# Patient Record
Sex: MALE | Race: WHITE | Employment: FULL TIME | URBAN - METROPOLITAN AREA
[De-identification: names, ages, dates, MRNs, and addresses within clinical notes are randomized per-mention and may not be internally consistent; named-entity substitution may affect disease eponyms.]

---

## 2018-01-24 ENCOUNTER — HOSPITAL ENCOUNTER (OUTPATIENT)
Dept: RADIOLOGY | Facility: REHABILITATION | Age: 33
End: 2018-01-24
Attending: PAIN MEDICINE
Payer: COMMERCIAL

## 2018-01-24 ENCOUNTER — HOSPITAL ENCOUNTER (OUTPATIENT)
Dept: PAIN MANAGEMENT | Facility: REHABILITATION | Age: 33
End: 2018-01-24
Attending: PAIN MEDICINE
Payer: COMMERCIAL

## 2018-01-24 VITALS
HEART RATE: 72 BPM | HEIGHT: 71 IN | TEMPERATURE: 98 F | BODY MASS INDEX: 24.88 KG/M2 | RESPIRATION RATE: 16 BRPM | SYSTOLIC BLOOD PRESSURE: 140 MMHG | OXYGEN SATURATION: 96 % | DIASTOLIC BLOOD PRESSURE: 77 MMHG | WEIGHT: 177.69 LBS

## 2018-01-24 PROCEDURE — 700102 HCHG RX REV CODE 250 W/ 637 OVERRIDE(OP)

## 2018-01-24 PROCEDURE — 77003 FLUOROGUIDE FOR SPINE INJECT: CPT

## 2018-01-24 PROCEDURE — A9270 NON-COVERED ITEM OR SERVICE: HCPCS

## 2018-01-24 PROCEDURE — 700117 HCHG RX CONTRAST REV CODE 255

## 2018-01-24 PROCEDURE — 64510 N BLOCK STELLATE GANGLION: CPT

## 2018-01-24 PROCEDURE — 700111 HCHG RX REV CODE 636 W/ 250 OVERRIDE (IP)

## 2018-01-24 RX ORDER — MIDAZOLAM HYDROCHLORIDE 1 MG/ML
INJECTION INTRAMUSCULAR; INTRAVENOUS
Status: COMPLETED
Start: 2018-01-24 | End: 2018-01-24

## 2018-01-24 RX ORDER — DEXAMETHASONE SODIUM PHOSPHATE 10 MG/ML
INJECTION, SOLUTION INTRAMUSCULAR; INTRAVENOUS
Status: COMPLETED
Start: 2018-01-24 | End: 2018-01-24

## 2018-01-24 RX ORDER — BUPIVACAINE HYDROCHLORIDE 2.5 MG/ML
INJECTION, SOLUTION EPIDURAL; INFILTRATION; INTRACAUDAL
Status: COMPLETED
Start: 2018-01-24 | End: 2018-01-24

## 2018-01-24 RX ORDER — CLONIDINE HYDROCHLORIDE 0.1 MG/1
TABLET ORAL
Status: COMPLETED
Start: 2018-01-24 | End: 2018-01-24

## 2018-01-24 RX ORDER — LIDOCAINE HYDROCHLORIDE 10 MG/ML
INJECTION, SOLUTION EPIDURAL; INFILTRATION; INTRACAUDAL; PERINEURAL
Status: COMPLETED
Start: 2018-01-24 | End: 2018-01-24

## 2018-01-24 RX ADMIN — MIDAZOLAM HYDROCHLORIDE 2 MG: 1 INJECTION, SOLUTION INTRAMUSCULAR; INTRAVENOUS at 09:53

## 2018-01-24 RX ADMIN — DEXAMETHASONE SODIUM PHOSPHATE 10 MG: 10 INJECTION, SOLUTION INTRAMUSCULAR; INTRAVENOUS at 09:54

## 2018-01-24 RX ADMIN — LIDOCAINE HYDROCHLORIDE 8 ML: 10 INJECTION, SOLUTION EPIDURAL; INFILTRATION; INTRACAUDAL; PERINEURAL at 09:52

## 2018-01-24 RX ADMIN — CLONIDINE HYDROCHLORIDE 0.1 MG: 0.1 TABLET ORAL at 09:54

## 2018-01-24 RX ADMIN — IOHEXOL 1 ML: 240 INJECTION, SOLUTION INTRATHECAL; INTRAVASCULAR; INTRAVENOUS; ORAL at 09:53

## 2018-01-24 RX ADMIN — BUPIVACAINE HYDROCHLORIDE 2 ML: 2.5 INJECTION, SOLUTION EPIDURAL; INFILTRATION; INTRACAUDAL; PERINEURAL at 09:54

## 2018-01-24 ASSESSMENT — PAIN SCALES - GENERAL
PAINLEVEL_OUTOF10: 0
PAINLEVEL_OUTOF10: 2
PAINLEVEL_OUTOF10: 0
PAINLEVEL_OUTOF10: 0

## 2018-01-24 NOTE — OP REPORT
STELLATE GANGLION BLOCK    ATTENDING: Corby Lara MD    ASSISTANT: None    Site: [facility]    PREOPERATIVE DIAGNOSIS: Complex regional pain syndrome of the left upper extremity.    POSTOPERATIVE DIAGNOSIS: Complex regional pain syndrome of the left upper extremity.    PROCEDURE PERFORMED:   1. Stellate ganglion block on the left side.  2. Fluoroscopy for precise needle placement.    ANESTHESIA: Local infiltration with 1% lidocaine and conscious sedation   Anesthesia time: 15 minutes    MONITORS: Automatic blood pressure cuff and pulse oximetry.    INDICATIONS:     MEDICATIONS: See patient's chart  (Please note that the patient's anticoagulant and/or aspirin were held appropriately)    ALLERGIES: See patient's chart    REVIEW OF SYSTEMS: Negative for fever, chills, chest pain, SOB, bleeding abnormalities, nausea, vomiting, diarrhea, worsening edema, or new rashes.    FOCUSED PHYSICAL EXAMINATION: The patient is awake, alert and oriented, and is in no acute distress. Vital signs are stable. The patient is afebrile. The rest of the PE is essentially unchanged from the patient's recent visit to our office.     We explained the procedure to the patient including the risks, benefits and alternatives to the procedure. The patient verbalized understanding and was willing to proceed.    PROCEDURE IN DETAIL: An informed consent was obtained. A peripheral IV line was placed. The patient was taken to the procedure room and was positively identified by the staff and the attending physician. The patient was positioned supine on the procedure bed. Vital signs were monitored as above and remained stable throughout procedure. The skin in the neck was prepped and draped in the standard sterile fashion. A surgical pause (time-out) was performed and was agreed upon by the members of the team. A fluoroscopic view of the cervical spine was obtained, and the C6 vertebra was identified. The C-arm was rotated obliquely toward the side  of injection until the neural foramina of C6 was in plain view. The skin over the base of the C6 uncinate process on the left side was anesthetized. A 22-gauge 3.5-inch needle was advanced onto base of the uncinate process of C6 on the right side. Full contact with the bone was established. Radiopaque contrast was injected in real-time fluoroscopy. There was no intravascular or intrathecal uptake of the dye. After that, a test dose of 1.5 ml of 1% lidocaine was injected. The patient denied having any signs of intravascular absorption of the local anesthetic. That was followed by injecting 4 mL of 0.25% bupivacaine mixed with 10 mg dexamethasone and 100 mcg clonidine. The injection was done in incremental manner. The needle was withdrawn.    The patient was observed in the recovery room. The patient developed Pete's sign on the side of the injection consistent with a successful stellate ganglion block.     COMPLICATIONS: None.    PAIN SCORE BEFORE THE PROCEDURE:      PAIN SCORE AFTER THE PROCEDURE:       DISPOSITION:  1. Discharge to home when the discharge criteria are met.  2. Follow up in pain clinic in 2-4 weeks.  3. (The patient will resume her medication).  4. (The patient will monitor their blood glucose over the next week as instructed and was advised to contact us if the reading is greater than 250mg/dL or if they develop any signs or symptoms of hyperglycemia.)

## 2018-01-24 NOTE — PROGRESS NOTES
Hand off report received from L.Gentiluomo CRUMP.Pt position supine by CST, RN and Xray tech.  Arms resting at sides on table.  Pillow placed under knees by CST.Hand off report given to L.Gentiluomo CRUMP

## 2018-01-24 NOTE — PROGRESS NOTES
Current meds. See medication reconciliation form. Reviewed with pt. Pt took Advil 400 mg yesterday.Pt denies taking ASA,other blood thinners or anti-inflammatories. Dr. Lara made aware pre-procedure.Pt has a ride post-procedure ( mother, Zina is ). Printed and verbal discharge instructions given to pt who verbalized understanding.